# Patient Record
Sex: FEMALE | Race: WHITE
[De-identification: names, ages, dates, MRNs, and addresses within clinical notes are randomized per-mention and may not be internally consistent; named-entity substitution may affect disease eponyms.]

---

## 2022-07-22 ENCOUNTER — P2P PATIENT RECORD (OUTPATIENT)
Age: 67
End: 2022-07-22

## 2022-08-19 ENCOUNTER — OFFICE VISIT (OUTPATIENT)
Dept: URBAN - METROPOLITAN AREA CLINIC 60 | Facility: CLINIC | Age: 67
End: 2022-08-19
Payer: COMMERCIAL

## 2022-08-19 ENCOUNTER — WEB ENCOUNTER (OUTPATIENT)
Dept: URBAN - METROPOLITAN AREA CLINIC 60 | Facility: CLINIC | Age: 67
End: 2022-08-19

## 2022-08-19 VITALS
BODY MASS INDEX: 46.61 KG/M2 | TEMPERATURE: 97.6 F | DIASTOLIC BLOOD PRESSURE: 84 MMHG | HEIGHT: 64 IN | SYSTOLIC BLOOD PRESSURE: 128 MMHG | HEART RATE: 89 BPM | OXYGEN SATURATION: 97 % | WEIGHT: 273 LBS

## 2022-08-19 DIAGNOSIS — E78.5 HYPERLIPIDEMIA, UNSPECIFIED HYPERLIPIDEMIA TYPE: ICD-10-CM

## 2022-08-19 DIAGNOSIS — K80.20 CALCULUS OF GALLBLADDER WITHOUT CHOLECYSTITIS WITHOUT OBSTRUCTION: ICD-10-CM

## 2022-08-19 DIAGNOSIS — K52.9 COLITIS: ICD-10-CM

## 2022-08-19 DIAGNOSIS — E66.9 OBESITY (BMI 35.0-39.9 WITHOUT COMORBIDITY): ICD-10-CM

## 2022-08-19 PROBLEM — 55822004 HYPERLIPIDAEMIA: Status: ACTIVE | Noted: 2022-08-19

## 2022-08-19 PROBLEM — 162864005: Status: ACTIVE | Noted: 2022-08-19

## 2022-08-19 PROCEDURE — 99202 OFFICE O/P NEW SF 15 MIN: CPT | Performed by: NURSE PRACTITIONER

## 2022-08-19 RX ORDER — DICYCLOMINE HYDROCHLORIDE 10 MG/1
CAPSULE ORAL
Qty: 60 CAPSULE | Status: ACTIVE | COMMUNITY

## 2022-08-19 RX ORDER — TOPIRAMATE 100 MG/1
TABLET, FILM COATED ORAL
Qty: 60 TABLET | Status: ACTIVE | COMMUNITY

## 2022-08-19 RX ORDER — MELOXICAM 15 MG/1
TABLET ORAL
Qty: 30 TABLET | Status: ACTIVE | COMMUNITY

## 2022-08-19 RX ORDER — BENZTROPINE MESYLATE 2 MG/1
TABLET ORAL
Qty: 60 TABLET | Status: ACTIVE | COMMUNITY

## 2022-08-19 RX ORDER — QUETIAPINE 100 MG/1
TAKE 1 TABLET BY MOUTH EVERY DAY TABLET, FILM COATED ORAL
Qty: 30 EACH | Refills: 1 | Status: ACTIVE | COMMUNITY

## 2022-08-19 RX ORDER — BUDESONIDE 3 MG/1
TAKE 1 CAPSULE BY MOUTH EVERY DAY CAPSULE, GELATIN COATED ORAL
Qty: 30 EACH | Refills: 2 | Status: ACTIVE | COMMUNITY

## 2022-08-19 RX ORDER — ACETAMINOPHEN AND CODEINE PHOSPHATE 300; 30 MG/1; MG/1
TAKE 1 TABLET BY MOUTH EVERY 6 HOURS AS NEEDED FOR PAIN TABLET ORAL
Qty: 6 EACH | Refills: 0 | Status: ON HOLD | COMMUNITY

## 2022-08-19 RX ORDER — ATORVASTATIN CALCIUM 20 MG/1
TABLET, FILM COATED ORAL
Qty: 30 TABLET | Status: ACTIVE | COMMUNITY

## 2022-08-19 RX ORDER — CLONAZEPAM 0.5 MG/1
TAKE 1 TABLET BY MOUTH 1 TIMES PER DAY AS NEEDED TABLET ORAL
Qty: 30 EACH | Refills: 0 | Status: ACTIVE | COMMUNITY

## 2022-08-19 RX ORDER — LURASIDONE HYDROCHLORIDE 80 MG/1
TABLET, FILM COATED ORAL
Qty: 30 TABLET | Status: ACTIVE | COMMUNITY

## 2022-08-19 RX ORDER — PRAZOSIN HYDROCHLORIDE 2 MG/1
TAKE 1 CAPSULE BY ORAL ROUTE DAILY AT BEDTIME CAPSULE ORAL
Qty: 30 EACH | Refills: 1 | Status: ACTIVE | COMMUNITY

## 2022-08-19 NOTE — HPI-TODAY'S VISIT:
She is here for RUQ pain . She was having severe RUQ pain and was seen in ER in July. RUQ US showed Cholelithiasis without cholecystitis. CT csan abdomen/Pelvis showed cholelithiasis and Diverticulosis without diverticulitis.  Today she reports that she has not had any further RUQ pain since she was seen in the ER.  She reports a history of colitis and was started on Budesonide 3mg qd and Dicyclomine 10mg bid 6 years ago . She has not stopped BUdesonide over the past 6 years. Her previous GI specialist was in [place]. She reports no personal history of colon polyps. She has no family history of colon cancer.

## 2022-09-21 ENCOUNTER — OFFICE VISIT (OUTPATIENT)
Dept: URBAN - METROPOLITAN AREA CLINIC 60 | Facility: CLINIC | Age: 67
End: 2022-09-21
Payer: COMMERCIAL

## 2022-09-21 VITALS
BODY MASS INDEX: 47.8 KG/M2 | DIASTOLIC BLOOD PRESSURE: 88 MMHG | SYSTOLIC BLOOD PRESSURE: 130 MMHG | WEIGHT: 280 LBS | HEART RATE: 89 BPM | TEMPERATURE: 97.6 F | OXYGEN SATURATION: 97 % | HEIGHT: 64 IN

## 2022-09-21 DIAGNOSIS — E66.01 MORBID (SEVERE) OBESITY DUE TO EXCESS CALORIES: ICD-10-CM

## 2022-09-21 DIAGNOSIS — E66.1 DRUG-INDUCED OBESITY: ICD-10-CM

## 2022-09-21 DIAGNOSIS — K80.20 CALCULUS OF GALLBLADDER WITHOUT CHOLECYSTITIS WITHOUT OBSTRUCTION: ICD-10-CM

## 2022-09-21 DIAGNOSIS — K52.9 COLITIS, NONSPECIFIC: ICD-10-CM

## 2022-09-21 PROBLEM — 70342003: Status: ACTIVE | Noted: 2022-08-19

## 2022-09-21 PROBLEM — 83911000119104: Status: ACTIVE | Noted: 2022-09-21

## 2022-09-21 PROBLEM — 408512008: Status: ACTIVE | Noted: 2022-09-21

## 2022-09-21 PROBLEM — 404811004: Status: ACTIVE | Noted: 2022-09-21

## 2022-09-21 PROBLEM — 190965006: Status: ACTIVE | Noted: 2022-09-21

## 2022-09-21 PROCEDURE — 99213 OFFICE O/P EST LOW 20 MIN: CPT | Performed by: NURSE PRACTITIONER

## 2022-09-21 RX ORDER — DICYCLOMINE HYDROCHLORIDE 10 MG/1
CAPSULE ORAL
Qty: 60 CAPSULE | Status: ACTIVE | COMMUNITY

## 2022-09-21 RX ORDER — PRAZOSIN HYDROCHLORIDE 2 MG/1
TAKE 1 CAPSULE BY ORAL ROUTE DAILY AT BEDTIME CAPSULE ORAL
Qty: 30 EACH | Refills: 1 | Status: ACTIVE | COMMUNITY

## 2022-09-21 RX ORDER — QUETIAPINE 100 MG/1
TAKE 1 TABLET BY MOUTH EVERY DAY TABLET, FILM COATED ORAL
Qty: 30 EACH | Refills: 1 | Status: ACTIVE | COMMUNITY

## 2022-09-21 RX ORDER — CLONAZEPAM 0.5 MG/1
TAKE 1 TABLET BY MOUTH 1 TIMES PER DAY AS NEEDED TABLET ORAL
Qty: 30 EACH | Refills: 0 | Status: ACTIVE | COMMUNITY

## 2022-09-21 RX ORDER — TOPIRAMATE 100 MG/1
TABLET, FILM COATED ORAL
Qty: 60 TABLET | Status: ACTIVE | COMMUNITY

## 2022-09-21 RX ORDER — MELOXICAM 15 MG/1
TABLET ORAL
Qty: 30 TABLET | Status: ACTIVE | COMMUNITY

## 2022-09-21 RX ORDER — ATORVASTATIN CALCIUM 20 MG/1
TABLET, FILM COATED ORAL
Qty: 30 TABLET | Status: ACTIVE | COMMUNITY

## 2022-09-21 RX ORDER — LURASIDONE HYDROCHLORIDE 80 MG/1
TABLET, FILM COATED ORAL
Qty: 30 TABLET | Status: ACTIVE | COMMUNITY

## 2022-09-21 RX ORDER — BUDESONIDE 3 MG/1
TAKE 1 CAPSULE BY MOUTH EVERY DAY CAPSULE, GELATIN COATED ORAL
Qty: 30 EACH | Refills: 2 | Status: ACTIVE | COMMUNITY

## 2022-09-21 RX ORDER — ACETAMINOPHEN AND CODEINE PHOSPHATE 300; 30 MG/1; MG/1
TAKE 1 TABLET BY MOUTH EVERY 6 HOURS AS NEEDED FOR PAIN TABLET ORAL
Qty: 6 EACH | Refills: 0 | Status: DISCONTINUED | COMMUNITY

## 2022-09-21 RX ORDER — BENZTROPINE MESYLATE 2 MG/1
TABLET ORAL
Qty: 60 TABLET | Status: ACTIVE | COMMUNITY

## 2022-09-21 NOTE — HPI-TODAY'S VISIT:
She is seen today on 1 month follow-up for unspecified colitis, review of previous colonoscopy records and RUQ Pain.  She was originally seen a month ago for right upper quadrant pain.  She had been seen in the ER for acute right upper quadrant pain at which time CT abdomen/pelvis and right upper quadrant ultrasound was completed   Which showed cholelithiasis without evidence of cholecystitis or ductal dilation.  LFTs and lipase were all normal. When she was seen at her last visit she reported having unspecified colitis.  She reports the colitis was diagnosed in [place] around 2015.  She reports she had gone to the hospital for abdominal pain and had imaging and colonoscopy completed.  She was started on dicyclomine and budesonide at the time by gastroenterologist.  She was never seen by the gastroenterologist as an outpatient and medications were continued by internal medicine ever since.  Today she reports she does not have a history of diarrhea and has not been having any abdominal pain or cramping. We have requested records at her last visit which were sent to the wrong gastroenterologist.  Today we have her fill out forms and have requested colonoscopy and pathology records from the hospital in [place] where procedure was completed. Today she is asymptomatic without complaints. She has been taking Budesonide 3mg qd and dicyclomine 10mg qd.

## 2022-12-15 ENCOUNTER — DASHBOARD ENCOUNTERS (OUTPATIENT)
Age: 67
End: 2022-12-15

## 2022-12-21 ENCOUNTER — OFFICE VISIT (OUTPATIENT)
Dept: URBAN - METROPOLITAN AREA CLINIC 60 | Facility: CLINIC | Age: 67
End: 2022-12-21

## 2022-12-21 RX ORDER — MELOXICAM 15 MG/1
TABLET ORAL
Qty: 30 TABLET | Status: ACTIVE | COMMUNITY

## 2022-12-21 RX ORDER — DICYCLOMINE HYDROCHLORIDE 10 MG/1
CAPSULE ORAL
Qty: 60 CAPSULE | Status: ACTIVE | COMMUNITY

## 2022-12-21 RX ORDER — CLONAZEPAM 0.5 MG/1
TAKE 1 TABLET BY MOUTH 1 TIMES PER DAY AS NEEDED TABLET ORAL
Qty: 30 EACH | Refills: 0 | Status: ACTIVE | COMMUNITY

## 2022-12-21 RX ORDER — PRAZOSIN HYDROCHLORIDE 2 MG/1
TAKE 1 CAPSULE BY ORAL ROUTE DAILY AT BEDTIME CAPSULE ORAL
Qty: 30 EACH | Refills: 1 | Status: ACTIVE | COMMUNITY

## 2022-12-21 RX ORDER — LURASIDONE HYDROCHLORIDE 80 MG/1
TABLET, FILM COATED ORAL
Qty: 30 TABLET | Status: ACTIVE | COMMUNITY

## 2022-12-21 RX ORDER — ATORVASTATIN CALCIUM 20 MG/1
TABLET, FILM COATED ORAL
Qty: 30 TABLET | Status: ACTIVE | COMMUNITY

## 2022-12-21 RX ORDER — BUDESONIDE 3 MG/1
TAKE 1 CAPSULE BY MOUTH EVERY DAY CAPSULE, GELATIN COATED ORAL
Qty: 30 EACH | Refills: 2 | Status: ACTIVE | COMMUNITY

## 2022-12-21 RX ORDER — TOPIRAMATE 100 MG/1
TABLET, FILM COATED ORAL
Qty: 60 TABLET | Status: ACTIVE | COMMUNITY

## 2022-12-21 RX ORDER — BENZTROPINE MESYLATE 2 MG/1
TABLET ORAL
Qty: 60 TABLET | Status: ACTIVE | COMMUNITY

## 2022-12-21 RX ORDER — QUETIAPINE 100 MG/1
TAKE 1 TABLET BY MOUTH EVERY DAY TABLET, FILM COATED ORAL
Qty: 30 EACH | Refills: 1 | Status: ACTIVE | COMMUNITY